# Patient Record
Sex: MALE | Race: WHITE
[De-identification: names, ages, dates, MRNs, and addresses within clinical notes are randomized per-mention and may not be internally consistent; named-entity substitution may affect disease eponyms.]

---

## 2022-05-06 ENCOUNTER — HOSPITAL ENCOUNTER (EMERGENCY)
Dept: HOSPITAL 47 - EC | Age: 22
Discharge: HOME | End: 2022-05-06
Payer: COMMERCIAL

## 2022-05-06 VITALS — HEART RATE: 88 BPM | DIASTOLIC BLOOD PRESSURE: 97 MMHG | SYSTOLIC BLOOD PRESSURE: 159 MMHG

## 2022-05-06 VITALS — RESPIRATION RATE: 20 BRPM | TEMPERATURE: 98 F

## 2022-05-06 DIAGNOSIS — J45.909: ICD-10-CM

## 2022-05-06 DIAGNOSIS — I10: Primary | ICD-10-CM

## 2022-05-06 PROCEDURE — 99283 EMERGENCY DEPT VISIT LOW MDM: CPT

## 2022-05-06 NOTE — ED
Recheck HPI





- General


Chief Complaint: Recheck/Abnormal Lab/Rx


Stated Complaint: Elevated blood pressure


Time Seen by Provider: 05/06/22 15:55


Source: patient


Mode of arrival: ambulatory


Limitations: no limitations





- History of Present Illness


Initial Comments: 


Patient is a 22-year-old male with a past medical history of hypertension who 

presents to the emergency department seeking blood pressure medication.  Patient

states he goes to school in [place] and after moving to [place] last year never 

established a primary care provider to continue his blood pressure medications. 

He states he has not been on blood pressure medication since October of 2021.  

Patient is unsure what medication he was last prescribed for blood pressure but 

believes it was hydrochlorothiazide.  Patient went to get a physical yesterday 

for a new job in [place] for the summer where his blood pressure was 160/100.  

Patient was told he needs evaluation and management of his blood pressure in 

order to start working.  Patient states he feels well with no concerns.  He 

denies headache, visual symptoms, chest pain, shortness of breath, and abdominal

pain.  His pressure today in triage is 190/103.  Patient does not check his 

blood pressure home.








- Related Data


                                Home Medications











 Medication  Instructions  Recorded  Confirmed


 


Acetaminophen Tab [Tylenol Tab] 1,000 mg PO Q6HR PRN 07/28/16 07/28/16








                                  Previous Rx's











 Medication  Instructions  Recorded


 


Lisinopril-Hctz 10-12.5 mg 1 tab PO DAILY #30 tab 05/06/22





[Zestoretic 10-12.5]  











                                    Allergies











Allergy/AdvReac Type Severity Reaction Status Date / Time


 


No Known Allergies Allergy   Verified 05/06/22 12:36














Review of Systems


ROS Statement: 


Those systems with pertinent positive or pertinent negative responses have been 

documented in the HPI.





ROS Other: All systems not noted in ROS Statement are negative.





Past Medical History


Past Medical History: Asthma


Additional Past Medical History / Comment(s): seasonal allergies


History of Any Multi-Drug Resistant Organisms: None Reported


Past Surgical History: Adenoidectomy, Hernia Repair, Tonsillectomy


Past Anesthesia/Blood Transfusion Reactions: No Reported Reaction


Past Psychological History: No Psychological Hx Reported


Smoking Status: Never smoker


Past Alcohol Use History: None Reported


Past Drug Use History: None Reported





- Past Family History


  ** Father


Family Medical History: Diabetes Mellitus, Hypertension


Additional Family Medical History / Comment(s): DMII





  ** Mother


Family Medical History: Hypertension


Additional Family Medical History / Comment(s): cardiovascular problems





General Exam


Limitations: no limitations


General appearance: alert, in no apparent distress


Head exam: Present: atraumatic, normocephalic, normal inspection


Eye exam: Present: normal appearance, PERRL, EOMI.  Absent: scleral icterus, 

conjunctival injection, periorbital swelling


Neck exam: Present: full ROM


Respiratory exam: Present: normal lung sounds bilaterally.  Absent: respiratory 

distress, wheezes, rales, rhonchi, stridor


Cardiovascular Exam: Present: regular rate, normal rhythm, normal heart sounds. 

Absent: systolic murmur, diastolic murmur, rubs, gallop, clicks


GI/Abdominal exam: Present: soft, normal bowel sounds.  Absent: distended, 

tenderness, guarding, rebound, rigid


Extremities exam: Absent: pedal edema


Neurological exam: Present: alert, oriented X3, CN II-XII intact


Psychiatric exam: Present: normal affect, normal mood


Skin exam: Present: warm, dry, intact, normal color.  Absent: rash





Course


                                   Vital Signs











  05/06/22 05/06/22





  12:34 17:15


 


Temperature 98 F 


 


Pulse Rate 110 H 88


 


Respiratory 20 20





Rate  


 


Blood Pressure 190/103 159/97


 


O2 Sat by Pulse 99 99





Oximetry  














Medical Decision Making





- Medical Decision Making


This is a 22-year-old with a past medical history of hypertension who presents 

for evaluation and management of blood pressure in order to start a new job. 

Thorough history and examination were performed. Patient normally resides in 

[place] for school however will be working in [place] for the summer.  Patient 

had elevated blood pressure at his physical yesterday.  His blood pressure in 

triage today is 190/103.  Patient feels well and has no concerns. 








Patient and I discussed the importance of establishing a primary care provider 

who can monitor and manage his blood pressure long-term.  Since patient is only 

here for the summer I will prescribe him 90 days of lisinopril 

hydrochlorothiazide.  Return parameters discussed.  Patient verbalizes 

understanding and is agreeable to this plan.








Dr. Lau is my attending. 








Disposition


Clinical Impression: 


 Hypertension





Disposition: HOME SELF-CARE


Condition: Good


Instructions (If sedation given, give patient instructions):  Hypertension (ED)


Additional Instructions: 


Please take medication as directed.  It is important to check your blood 

pressure daily and establish a primary care provider for monitoring and 

management of your blood pressure long-term.  Blood pressure cuff and monitor 

can be bought at a local pharmacy.  Please return to the emergency department if

you experience new, concerning, or worsening symptoms.


Prescriptions: 


Lisinopril-Hctz 10-12.5 mg [Zestoretic 10-12.5] 1 tab PO DAILY #30 tab


Is patient prescribed a controlled substance at d/c from ED?: No


Referrals: 


None,Stated [Primary Care Provider] - 1-2 days


Time of Disposition: 16:21